# Patient Record
Sex: FEMALE | Race: WHITE | NOT HISPANIC OR LATINO | URBAN - METROPOLITAN AREA
[De-identification: names, ages, dates, MRNs, and addresses within clinical notes are randomized per-mention and may not be internally consistent; named-entity substitution may affect disease eponyms.]

---

## 2023-03-24 PROBLEM — H18.603 KERATOCONUS OF BOTH EYES: Status: ACTIVE | Noted: 2023-03-24

## 2023-03-24 PROBLEM — R06.02 SOB (SHORTNESS OF BREATH): Status: ACTIVE | Noted: 2023-03-24

## 2023-03-24 PROBLEM — D21.9 FIBROID: Status: ACTIVE | Noted: 2023-03-24

## 2023-03-24 PROBLEM — M06.9 RHEUMATOID ARTHRITIS (MULTI): Status: ACTIVE | Noted: 2023-03-24

## 2023-03-24 PROBLEM — H25.811 COMBINED FORMS OF AGE-RELATED CATARACT OF RIGHT EYE: Status: ACTIVE | Noted: 2023-03-24

## 2023-03-24 PROBLEM — R53.83 FATIGUE: Status: ACTIVE | Noted: 2023-03-24

## 2023-03-24 PROBLEM — M85.80 OSTEOPENIA: Status: ACTIVE | Noted: 2023-03-24

## 2023-03-24 PROBLEM — E78.5 HYPERLIPIDEMIA: Status: ACTIVE | Noted: 2023-03-24

## 2023-03-24 PROBLEM — R07.81 RIB PAIN ON LEFT SIDE: Status: ACTIVE | Noted: 2023-03-24

## 2023-03-24 PROBLEM — R50.9 FEBRILE ILLNESS: Status: ACTIVE | Noted: 2023-03-24

## 2023-03-24 PROBLEM — R19.4 CHANGE IN BOWEL HABITS: Status: ACTIVE | Noted: 2023-03-24

## 2023-03-24 PROBLEM — G47.419 NARCOLEPSY (HHS-HCC): Status: ACTIVE | Noted: 2023-03-24

## 2023-03-24 PROBLEM — N18.9 CKD (CHRONIC KIDNEY DISEASE): Status: ACTIVE | Noted: 2023-03-24

## 2023-03-24 PROBLEM — H91.90 HEARING DIFFICULTY: Status: ACTIVE | Noted: 2023-03-24

## 2023-03-24 PROBLEM — K59.00 CONSTIPATION: Status: ACTIVE | Noted: 2023-03-24

## 2023-03-24 PROBLEM — K44.9 HIATAL HERNIA: Status: ACTIVE | Noted: 2023-03-24

## 2023-03-24 PROBLEM — N39.0 RECURRENT UTI (URINARY TRACT INFECTION): Status: ACTIVE | Noted: 2023-03-24

## 2023-03-24 PROBLEM — R07.89 CHEST PAIN, ATYPICAL: Status: ACTIVE | Noted: 2023-03-24

## 2023-03-24 PROBLEM — H25.812 COMBINED FORMS OF AGE-RELATED CATARACT OF LEFT EYE: Status: ACTIVE | Noted: 2023-03-24

## 2023-03-24 PROBLEM — E67.3 HIGH VITAMIN D LEVEL: Status: ACTIVE | Noted: 2023-03-24

## 2023-03-24 PROBLEM — N83.299 OTHER OVARIAN CYST, UNSPECIFIED SIDE: Status: ACTIVE | Noted: 2023-03-24

## 2023-03-24 PROBLEM — N83.9 LESION OF LEFT OVARY: Status: ACTIVE | Noted: 2023-03-24

## 2023-03-24 PROBLEM — M15.9 GENERALIZED OSTEOARTHRITIS OF HAND: Status: ACTIVE | Noted: 2023-03-24

## 2023-03-24 PROBLEM — D25.9 UTERINE FIBROID: Status: ACTIVE | Noted: 2023-03-24

## 2023-03-24 PROBLEM — N13.30 HYDRONEPHROSIS OF LEFT KIDNEY: Status: ACTIVE | Noted: 2023-03-24

## 2023-03-24 RX ORDER — ESTRADIOL 0.1 MG/G
CREAM VAGINAL
COMMUNITY
End: 2024-03-05 | Stop reason: ALTCHOICE

## 2023-03-24 RX ORDER — ATORVASTATIN CALCIUM 40 MG/1
1 TABLET, FILM COATED ORAL DAILY
COMMUNITY
End: 2023-12-18

## 2023-03-24 RX ORDER — BENZONATATE 200 MG/1
200 CAPSULE ORAL 3 TIMES DAILY PRN
COMMUNITY
End: 2024-03-05 | Stop reason: ALTCHOICE

## 2023-03-24 RX ORDER — ARMODAFINIL 200 MG/1
0.5 TABLET ORAL
COMMUNITY
End: 2024-03-05 | Stop reason: ALTCHOICE

## 2023-03-24 RX ORDER — HYDROXYCHLOROQUINE SULFATE 200 MG/1
1 TABLET, FILM COATED ORAL DAILY
COMMUNITY
Start: 2021-08-30

## 2023-03-24 RX ORDER — DOCUSATE SODIUM 250 MG
1 CAPSULE ORAL DAILY
COMMUNITY

## 2023-03-29 ENCOUNTER — OFFICE VISIT (OUTPATIENT)
Dept: PRIMARY CARE | Facility: CLINIC | Age: 73
End: 2023-03-29
Payer: MEDICARE

## 2023-03-29 VITALS
DIASTOLIC BLOOD PRESSURE: 80 MMHG | RESPIRATION RATE: 16 BRPM | HEIGHT: 63 IN | HEART RATE: 58 BPM | SYSTOLIC BLOOD PRESSURE: 146 MMHG | OXYGEN SATURATION: 98 % | TEMPERATURE: 97.3 F | WEIGHT: 128.6 LBS | BODY MASS INDEX: 22.79 KG/M2

## 2023-03-29 DIAGNOSIS — G47.419 PRIMARY NARCOLEPSY WITHOUT CATAPLEXY (HHS-HCC): ICD-10-CM

## 2023-03-29 DIAGNOSIS — M15.9 GENERALIZED OSTEOARTHRITIS OF HAND: ICD-10-CM

## 2023-03-29 DIAGNOSIS — Z12.31 ENCOUNTER FOR SCREENING MAMMOGRAM FOR BREAST CANCER: ICD-10-CM

## 2023-03-29 DIAGNOSIS — M85.89 OSTEOPENIA OF MULTIPLE SITES: ICD-10-CM

## 2023-03-29 DIAGNOSIS — R06.02 SOB (SHORTNESS OF BREATH): ICD-10-CM

## 2023-03-29 DIAGNOSIS — R06.02 EXERTIONAL SHORTNESS OF BREATH: ICD-10-CM

## 2023-03-29 DIAGNOSIS — M06.9 RHEUMATOID ARTHRITIS INVOLVING MULTIPLE SITES, UNSPECIFIED WHETHER RHEUMATOID FACTOR PRESENT (MULTI): ICD-10-CM

## 2023-03-29 DIAGNOSIS — Z00.00 ROUTINE GENERAL MEDICAL EXAMINATION AT HEALTH CARE FACILITY: Primary | ICD-10-CM

## 2023-03-29 DIAGNOSIS — R03.0 ELEVATED BLOOD PRESSURE READING WITHOUT DIAGNOSIS OF HYPERTENSION: ICD-10-CM

## 2023-03-29 DIAGNOSIS — E78.2 MIXED HYPERLIPIDEMIA: ICD-10-CM

## 2023-03-29 PROCEDURE — 1160F RVW MEDS BY RX/DR IN RCRD: CPT | Performed by: INTERNAL MEDICINE

## 2023-03-29 PROCEDURE — 1159F MED LIST DOCD IN RCRD: CPT | Performed by: INTERNAL MEDICINE

## 2023-03-29 PROCEDURE — 1170F FXNL STATUS ASSESSED: CPT | Performed by: INTERNAL MEDICINE

## 2023-03-29 PROCEDURE — 1036F TOBACCO NON-USER: CPT | Performed by: INTERNAL MEDICINE

## 2023-03-29 PROCEDURE — 93000 ELECTROCARDIOGRAM COMPLETE: CPT | Performed by: INTERNAL MEDICINE

## 2023-03-29 PROCEDURE — G0439 PPPS, SUBSEQ VISIT: HCPCS | Performed by: INTERNAL MEDICINE

## 2023-03-29 ASSESSMENT — ENCOUNTER SYMPTOMS
PSYCHIATRIC NEGATIVE: 1
ABDOMINAL PAIN: 0
MYALGIAS: 0
UNEXPECTED WEIGHT CHANGE: 0
VOMITING: 0
HEMATOLOGIC/LYMPHATIC NEGATIVE: 1
DYSURIA: 0
TREMORS: 0
SLEEP DISTURBANCE: 0
COLOR CHANGE: 0
BACK PAIN: 0
CARDIOVASCULAR NEGATIVE: 1
ARTHRALGIAS: 0
LOSS OF SENSATION IN FEET: 0
OCCASIONAL FEELINGS OF UNSTEADINESS: 0
CONSTIPATION: 0
SEIZURES: 0
DIFFICULTY URINATING: 0
SINUS PAIN: 0
SORE THROAT: 0
WEAKNESS: 0
BLOOD IN STOOL: 0
NECK PAIN: 0
CONFUSION: 0
NUMBNESS: 0
DEPRESSION: 0
ALLERGIC/IMMUNOLOGIC NEGATIVE: 1
EYE PAIN: 0
COUGH: 0
DIZZINESS: 0
NECK STIFFNESS: 0
MUSCULOSKELETAL NEGATIVE: 1
NERVOUS/ANXIOUS: 0
CHEST TIGHTNESS: 0
STRIDOR: 0
POLYDIPSIA: 0
NAUSEA: 0
ACTIVITY CHANGE: 0
EYE DISCHARGE: 0
VOICE CHANGE: 0
LIGHT-HEADEDNESS: 0
HALLUCINATIONS: 0
BRUISES/BLEEDS EASILY: 0
HEADACHES: 0
ADENOPATHY: 0
SHORTNESS OF BREATH: 1
CONSTITUTIONAL NEGATIVE: 1
FREQUENCY: 0
FLANK PAIN: 0
APPETITE CHANGE: 0
POLYPHAGIA: 0
NEUROLOGICAL NEGATIVE: 1
DIARRHEA: 0
FACIAL ASYMMETRY: 0
AGITATION: 0
SINUS PRESSURE: 0
ENDOCRINE NEGATIVE: 1
TROUBLE SWALLOWING: 0
SPEECH DIFFICULTY: 0
JOINT SWELLING: 0
WHEEZING: 0
EYE REDNESS: 0
WOUND: 0
GASTROINTESTINAL NEGATIVE: 1
EYES NEGATIVE: 1
PALPITATIONS: 0

## 2023-03-29 ASSESSMENT — PATIENT HEALTH QUESTIONNAIRE - PHQ9
1. LITTLE INTEREST OR PLEASURE IN DOING THINGS: NOT AT ALL
2. FEELING DOWN, DEPRESSED OR HOPELESS: NOT AT ALL
SUM OF ALL RESPONSES TO PHQ9 QUESTIONS 1 AND 2: 0

## 2023-03-29 ASSESSMENT — ACTIVITIES OF DAILY LIVING (ADL)
DOING_HOUSEWORK: INDEPENDENT
MANAGING_FINANCES: INDEPENDENT
GROCERY_SHOPPING: INDEPENDENT
DRESSING: INDEPENDENT
TAKING_MEDICATION: INDEPENDENT
BATHING: INDEPENDENT

## 2023-03-29 NOTE — ASSESSMENT & PLAN NOTE
Please maintain enough calcium in your diet:  1948-8968 mg daily (consume foods rich in calcium rather than taking only calcium supplement to meet daily calcium requirements), take daily Vitamin D supplement with meal. Average Vit D dose is 2000 international units daily.  Weight bearing exercise routine is recommended.

## 2023-03-29 NOTE — PROGRESS NOTES
Subjective   Reason for Visit: Sonya Schaffer is an 72 y.o. female here for a Medicare Wellness visit.     Past Medical, Surgical, and Family History reviewed and updated in chart.    Reviewed all medications by prescribing practitioner or clinical pharmacist (such as prescriptions, OTCs, herbal therapies and supplements) and documented in the medical record.    HPI    Patient comes for Medicare wellness visit.     Patient has been feeling pretty good and has been complaint with prescribed medications.    URI sx resolved, ribcage pain resolved, no fx seen on X-ray in Feb 2023.    We reviewed and discussed details of recent blood work: CBC, CMP, TSH, Lipid panel, Hb A1c, Vit D done in Oct 2022.  Results within acceptable range.      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    Concerned abut SOB with exertion, ( when walking up the hill), she has been exercising regularly, her HR usually below 90 bpm.  Today's EKG nl, will obtain treadmill stress test.   Elevated blood pressure noted on arrival. Decreased after resting in the office. I personally rechecked patient's blood pressure.           Patient Care Team:  Kelsie Bolden MD PhD as PCP - General  Kelsie Bolden MD PhD as PCP - [facility] [employer] [facility] PCP     Review of Systems   Constitutional: Negative.  Negative for activity change, appetite change and unexpected weight change.   HENT: Negative.  Negative for congestion, ear discharge, ear pain, hearing loss, mouth sores, nosebleeds, sinus pressure, sinus pain, sore throat, trouble swallowing and voice change.    Eyes: Negative.  Negative for pain, discharge, redness and visual disturbance.   Respiratory:  Positive for shortness of breath. Negative for cough, chest tightness, wheezing and stridor.    Cardiovascular: Negative.  Negative for chest pain, palpitations and leg swelling.   Gastrointestinal: Negative.  Negative for abdominal pain, blood in stool, constipation,  "diarrhea, nausea and vomiting.   Endocrine: Negative.  Negative for polydipsia, polyphagia and polyuria.   Genitourinary: Negative.  Negative for difficulty urinating, dysuria, flank pain, frequency and urgency.   Musculoskeletal: Negative.  Negative for arthralgias, back pain, gait problem, joint swelling, myalgias, neck pain and neck stiffness.   Skin: Negative.  Negative for color change, rash and wound.   Allergic/Immunologic: Negative.  Negative for environmental allergies, food allergies and immunocompromised state.   Neurological: Negative.  Negative for dizziness, tremors, seizures, syncope, facial asymmetry, speech difficulty, weakness, light-headedness, numbness and headaches.   Hematological: Negative.  Negative for adenopathy. Does not bruise/bleed easily.   Psychiatric/Behavioral: Negative.  Negative for agitation, behavioral problems, confusion, hallucinations, sleep disturbance and suicidal ideas. The patient is not nervous/anxious.    All other systems reviewed and are negative.      Objective   Vitals:  /80   Pulse 58   Temp 36.3 °C (97.3 °F)   Resp 16   Ht 1.6 m (5' 3\")   Wt 58.3 kg (128 lb 9.6 oz)   SpO2 98%   BMI 22.78 kg/m²       Physical Exam  Vitals and nursing note reviewed.   Constitutional:       General: She is not in acute distress.     Appearance: Normal appearance.   HENT:      Head: Normocephalic and atraumatic.      Right Ear: Tympanic membrane, ear canal and external ear normal.      Left Ear: Tympanic membrane, ear canal and external ear normal.      Nose: Nose normal. No congestion or rhinorrhea.   Eyes:      General:         Right eye: No discharge.         Left eye: No discharge.      Extraocular Movements: Extraocular movements intact.      Conjunctiva/sclera: Conjunctivae normal.      Pupils: Pupils are equal, round, and reactive to light.   Cardiovascular:      Rate and Rhythm: Normal rate and regular rhythm.      Pulses: Normal pulses.      Heart sounds: Normal " heart sounds. No murmur heard.     No friction rub. No gallop.   Pulmonary:      Effort: Pulmonary effort is normal. No respiratory distress.      Breath sounds: Normal breath sounds. No stridor. No wheezing, rhonchi or rales.   Chest:      Chest wall: No tenderness.   Abdominal:      General: Bowel sounds are normal.      Palpations: Abdomen is soft. There is no mass.      Tenderness: There is no abdominal tenderness. There is no guarding or rebound.   Musculoskeletal:         General: No swelling or deformity. Normal range of motion.      Cervical back: Normal range of motion and neck supple. No rigidity or tenderness.      Right lower leg: No edema.      Left lower leg: No edema.   Lymphadenopathy:      Cervical: No cervical adenopathy.   Skin:     General: Skin is warm and dry.      Coloration: Skin is not jaundiced.      Findings: No bruising or erythema.   Neurological:      General: No focal deficit present.      Mental Status: She is alert and oriented to person, place, and time. Mental status is at baseline.      Cranial Nerves: No cranial nerve deficit.      Motor: No weakness.      Coordination: Coordination normal.      Gait: Gait normal.   Psychiatric:         Mood and Affect: Mood normal.         Behavior: Behavior normal.         Thought Content: Thought content normal.         Judgment: Judgment normal.         Assessment/Plan   Problem List Items Addressed This Visit          Nervous    Narcolepsy    Current Assessment & Plan     Clinically stable. F/up with sleep specialist.            Respiratory    SOB (shortness of breath)    Exertional shortness of breath    Relevant Orders    ECG 12 lead (Clinic Performed) (Completed)    Stress Test       Circulatory    Elevated blood pressure reading without diagnosis of hypertension    Current Assessment & Plan     Monitor BP at home (with ORON 5), bring records for next appt.             Musculoskeletal    Generalized osteoarthritis of hand    Current  Assessment & Plan     Ise Voltaren gel as needed, consult rheumatologist isf sx not improving.         Osteopenia    Current Assessment & Plan     Please maintain enough calcium in your diet:  6026-7404 mg daily (consume foods rich in calcium rather than taking only calcium supplement to meet daily calcium requirements), take daily Vitamin D supplement with meal. Average Vit D dose is 2000 international units daily.  Weight bearing exercise routine is recommended.             Other    Hyperlipidemia    Current Assessment & Plan     Low cholesterol diet is advised. Continue statin.         Rheumatoid arthritis (CMS/[facility])    Overview     Chronic condition documentation: stable based on symptoms and exam. Continue established treatment plan and follow-up at least yearly. Additional details: under rheumatology and PCP care         Current Assessment & Plan     Clinically stable. F/up with rheumatologist.         Encounter for screening mammogram for breast cancer    Relevant Orders    BI mammo bilateral screening tomosynthesis    Routine general medical examination at health care facility - Primary    Current Assessment & Plan     Exam nl except elevated BP.          It was a pleasure to see you today.  I would like to remind you about importance of a healthy lifestyle in order to improve your well-being and live longer.  Try to engage in physical activities for at least 150 minutes per week.  Eat about 10 servings of fruits and vegetables daily. My advice is 2 servings of fruits and 8 servings of vegetables.  For vegetables choose at least half of them green and at least half of them fresh.  Please avoid sugar, salt, fried food and saturated fat.    Please bring copy of your Healthcare living Will and POA for next visit as discussed.    F/up in 3 months or sooner if needed.

## 2023-07-31 DIAGNOSIS — N83.9 LESION OF LEFT OVARY: Primary | ICD-10-CM

## 2023-07-31 DIAGNOSIS — D39.12 NEOPLASM OF UNCERTAIN BEHAVIOR OF LEFT OVARY: ICD-10-CM

## 2023-08-31 LAB — CANCER AG 125 (U/ML) IN SERUM: 6.8 U/ML (ref 0–30.2)

## 2023-10-25 ENCOUNTER — APPOINTMENT (OUTPATIENT)
Dept: UROLOGY | Facility: CLINIC | Age: 73
End: 2023-10-25
Payer: MEDICARE

## 2023-12-16 DIAGNOSIS — E78.2 MIXED HYPERLIPIDEMIA: Primary | ICD-10-CM

## 2023-12-18 RX ORDER — ATORVASTATIN CALCIUM 40 MG/1
40 TABLET, FILM COATED ORAL DAILY
Qty: 90 TABLET | Refills: 0 | Status: SHIPPED | OUTPATIENT
Start: 2023-12-18 | End: 2024-03-29

## 2024-03-05 ENCOUNTER — OFFICE VISIT (OUTPATIENT)
Dept: PRIMARY CARE | Facility: CLINIC | Age: 74
End: 2024-03-05
Payer: COMMERCIAL

## 2024-03-05 VITALS
RESPIRATION RATE: 14 BRPM | HEIGHT: 63 IN | BODY MASS INDEX: 22.82 KG/M2 | OXYGEN SATURATION: 97 % | SYSTOLIC BLOOD PRESSURE: 136 MMHG | TEMPERATURE: 97.9 F | HEART RATE: 60 BPM | WEIGHT: 128.8 LBS | DIASTOLIC BLOOD PRESSURE: 70 MMHG

## 2024-03-05 DIAGNOSIS — M85.89 OSTEOPENIA OF MULTIPLE SITES: ICD-10-CM

## 2024-03-05 DIAGNOSIS — N83.299 OTHER OVARIAN CYST, UNSPECIFIED SIDE: ICD-10-CM

## 2024-03-05 DIAGNOSIS — Z00.00 ROUTINE GENERAL MEDICAL EXAMINATION AT HEALTH CARE FACILITY: Primary | ICD-10-CM

## 2024-03-05 DIAGNOSIS — R03.0 ELEVATED BLOOD PRESSURE READING WITHOUT DIAGNOSIS OF HYPERTENSION: ICD-10-CM

## 2024-03-05 DIAGNOSIS — R53.83 OTHER FATIGUE: ICD-10-CM

## 2024-03-05 DIAGNOSIS — Z12.31 ENCOUNTER FOR SCREENING MAMMOGRAM FOR BREAST CANCER: ICD-10-CM

## 2024-03-05 DIAGNOSIS — Z78.0 ASYMPTOMATIC MENOPAUSAL STATE: ICD-10-CM

## 2024-03-05 DIAGNOSIS — E78.2 MIXED HYPERLIPIDEMIA: ICD-10-CM

## 2024-03-05 DIAGNOSIS — M06.9 RHEUMATOID ARTHRITIS INVOLVING MULTIPLE SITES, UNSPECIFIED WHETHER RHEUMATOID FACTOR PRESENT (MULTI): ICD-10-CM

## 2024-03-05 PROCEDURE — 1123F ACP DISCUSS/DSCN MKR DOCD: CPT | Performed by: INTERNAL MEDICINE

## 2024-03-05 PROCEDURE — 1170F FXNL STATUS ASSESSED: CPT | Performed by: INTERNAL MEDICINE

## 2024-03-05 PROCEDURE — 1158F ADVNC CARE PLAN TLK DOCD: CPT | Performed by: INTERNAL MEDICINE

## 2024-03-05 PROCEDURE — G0439 PPPS, SUBSEQ VISIT: HCPCS | Performed by: INTERNAL MEDICINE

## 2024-03-05 PROCEDURE — 1160F RVW MEDS BY RX/DR IN RCRD: CPT | Performed by: INTERNAL MEDICINE

## 2024-03-05 PROCEDURE — 1159F MED LIST DOCD IN RCRD: CPT | Performed by: INTERNAL MEDICINE

## 2024-03-05 PROCEDURE — 1126F AMNT PAIN NOTED NONE PRSNT: CPT | Performed by: INTERNAL MEDICINE

## 2024-03-05 PROCEDURE — 1036F TOBACCO NON-USER: CPT | Performed by: INTERNAL MEDICINE

## 2024-03-05 PROCEDURE — 99397 PER PM REEVAL EST PAT 65+ YR: CPT | Performed by: INTERNAL MEDICINE

## 2024-03-05 PROCEDURE — 99214 OFFICE O/P EST MOD 30 MIN: CPT | Performed by: INTERNAL MEDICINE

## 2024-03-05 ASSESSMENT — ENCOUNTER SYMPTOMS
NERVOUS/ANXIOUS: 0
WEAKNESS: 0
PSYCHIATRIC NEGATIVE: 1
SLEEP DISTURBANCE: 0
SEIZURES: 0
DIZZINESS: 0
TREMORS: 0
BRUISES/BLEEDS EASILY: 0
LOSS OF SENSATION IN FEET: 0
NAUSEA: 0
DEPRESSION: 0
SHORTNESS OF BREATH: 0
HALLUCINATIONS: 0
LIGHT-HEADEDNESS: 0
POLYPHAGIA: 0
RESPIRATORY NEGATIVE: 1
NECK STIFFNESS: 0
COUGH: 0
EYE PAIN: 0
NEUROLOGICAL NEGATIVE: 1
WOUND: 0
ENDOCRINE NEGATIVE: 1
GASTROINTESTINAL NEGATIVE: 1
PALPITATIONS: 0
ACTIVITY CHANGE: 0
NUMBNESS: 0
HEMATOLOGIC/LYMPHATIC NEGATIVE: 1
OCCASIONAL FEELINGS OF UNSTEADINESS: 0
EYE REDNESS: 0
CARDIOVASCULAR NEGATIVE: 1
WHEEZING: 0
VOICE CHANGE: 0
ADENOPATHY: 0
CONFUSION: 0
DYSURIA: 0
TROUBLE SWALLOWING: 0
AGITATION: 0
ABDOMINAL PAIN: 0
SINUS PAIN: 0
BLOOD IN STOOL: 0
FLANK PAIN: 0
NECK PAIN: 0
CONSTIPATION: 0
BACK PAIN: 0
CHEST TIGHTNESS: 0
CONSTITUTIONAL NEGATIVE: 1
MYALGIAS: 0
EYE DISCHARGE: 0
SORE THROAT: 0
MUSCULOSKELETAL NEGATIVE: 1
ARTHRALGIAS: 0
JOINT SWELLING: 0
APPETITE CHANGE: 0
DIARRHEA: 0
STRIDOR: 0
HEADACHES: 0
DIFFICULTY URINATING: 0
ALLERGIC/IMMUNOLOGIC NEGATIVE: 1
COLOR CHANGE: 0
VOMITING: 0
UNEXPECTED WEIGHT CHANGE: 0
POLYDIPSIA: 0
SINUS PRESSURE: 0
FACIAL ASYMMETRY: 0
SPEECH DIFFICULTY: 0
FREQUENCY: 0
EYES NEGATIVE: 1

## 2024-03-05 ASSESSMENT — ACTIVITIES OF DAILY LIVING (ADL)
DOING_HOUSEWORK: INDEPENDENT
MANAGING_FINANCES: INDEPENDENT
GROCERY_SHOPPING: INDEPENDENT
BATHING: INDEPENDENT
TAKING_MEDICATION: INDEPENDENT
DRESSING: INDEPENDENT

## 2024-03-05 NOTE — ASSESSMENT & PLAN NOTE
Monitor BP at home (with ORON 5), bring records for next appt.   Low salt diet is advised. Let me know if blood pressure consistently above 140/90.

## 2024-03-05 NOTE — ASSESSMENT & PLAN NOTE
Please maintain enough calcium in your diet:  2464-4310 mg daily (consume foods rich in calcium rather than taking only calcium supplement to meet daily calcium requirements), take daily Vitamin D supplement with meal. Average Vit D dose is 2000 international units daily.  Weight bearing exercise routine is recommended.

## 2024-03-05 NOTE — PROGRESS NOTES
Subjective   Reason for Visit: Sonya Schaffer is an 73 y.o. female here for a Medicare Wellness visit.     Past Medical, Surgical, and Family History reviewed and updated in chart.    Reviewed all medications by prescribing practitioner or clinical pharmacist (such as prescriptions, OTCs, herbal therapies and supplements) and documented in the medical record.    [facility]    Patient Care Team:  Kelsie Bolden MD PhD as PCP - General  Kelsie Bolden MD PhD as PCP - [facility] P[facility]  Kelsie Bolden MD PhD as PCP - [facility] PCP       Patient comes for Medicare Wellness, Physical Exam and Follow up visit.     Patient has been feeling pretty good and has been complaint with prescribed medications.    We reviewed blood work including CBC, CMP, TSH, Lipid Panel, HbA1c, Vit D level done in .  Results within acceptable range.    Mammogram: 2023  DEXA: : osteopenia  Colon ca screenin, next   Pneumonia Vacc:   Advance Directives: Advance Directives: Healthcare Living Will and POA not on file. Patient advised to complete forms and bring/mail to the office.   POA discussed, confirmed and documented in patient's  EPIC record.     Patient continues to work part time at , enjoying her time with children.     Elevated blood pressure noted on arrival. Decreased after resting in the office. I personally rechecked patient's blood pressure.     She went to Urgent Care few months ago with UTI sx, treated with antibiotic and improved.     No longer using  Estrogen cream.    Review of Systems   Constitutional: Negative.  Negative for activity change, appetite change and unexpected weight change.   HENT: Negative.  Negative for congestion, ear discharge, ear pain, hearing loss, mouth sores, nosebleeds, sinus pressure, sinus pain, sore throat, trouble swallowing and voice change.    Eyes: Negative.  Negative for pain, discharge, redness and visual disturbance.  "  Respiratory: Negative.  Negative for cough, chest tightness, shortness of breath, wheezing and stridor.    Cardiovascular: Negative.  Negative for chest pain, palpitations and leg swelling.   Gastrointestinal: Negative.  Negative for abdominal pain, blood in stool, constipation, diarrhea, nausea and vomiting.   Endocrine: Negative.  Negative for polydipsia, polyphagia and polyuria.   Genitourinary: Negative.  Negative for difficulty urinating, dysuria, flank pain, frequency and urgency.   Musculoskeletal: Negative.  Negative for arthralgias, back pain, gait problem, joint swelling, myalgias, neck pain and neck stiffness.   Skin: Negative.  Negative for color change, rash and wound.   Allergic/Immunologic: Negative.  Negative for environmental allergies, food allergies and immunocompromised state.   Neurological: Negative.  Negative for dizziness, tremors, seizures, syncope, facial asymmetry, speech difficulty, weakness, light-headedness, numbness and headaches.   Hematological: Negative.  Negative for adenopathy. Does not bruise/bleed easily.   Psychiatric/Behavioral: Negative.  Negative for agitation, behavioral problems, confusion, hallucinations, sleep disturbance and suicidal ideas. The patient is not nervous/anxious.    All other systems reviewed and are negative.      Objective   Vitals:  /70   Pulse 60   Temp 36.6 °C (97.9 °F)   Resp 14   Ht 1.6 m (5' 3\")   Wt 58.4 kg (128 lb 12.8 oz)   SpO2 97%   BMI 22.82 kg/m²       Physical Exam  Vitals and nursing note reviewed.   Constitutional:       General: She is not in acute distress.     Appearance: Normal appearance.   HENT:      Head: Normocephalic and atraumatic.      Right Ear: Tympanic membrane, ear canal and external ear normal.      Left Ear: Tympanic membrane, ear canal and external ear normal.      Nose: Nose normal. No congestion or rhinorrhea.      Mouth/Throat:      Mouth: Mucous membranes are moist.      Pharynx: Oropharynx is clear. "   Eyes:      General:         Right eye: No discharge.         Left eye: No discharge.      Extraocular Movements: Extraocular movements intact.      Conjunctiva/sclera: Conjunctivae normal.      Pupils: Pupils are equal, round, and reactive to light.   Cardiovascular:      Rate and Rhythm: Normal rate and regular rhythm.      Pulses: Normal pulses.      Heart sounds: Normal heart sounds. No murmur heard.     No friction rub. No gallop.   Pulmonary:      Effort: Pulmonary effort is normal. No respiratory distress.      Breath sounds: Normal breath sounds. No stridor. No wheezing, rhonchi or rales.   Chest:      Chest wall: No tenderness.   Abdominal:      General: Bowel sounds are normal.      Palpations: Abdomen is soft. There is no mass.      Tenderness: There is no abdominal tenderness. There is no guarding or rebound.   Musculoskeletal:         General: No swelling or deformity. Normal range of motion.      Cervical back: Normal range of motion and neck supple. No rigidity or tenderness.      Right lower leg: No edema.      Left lower leg: No edema.   Lymphadenopathy:      Cervical: No cervical adenopathy.   Skin:     General: Skin is warm and dry.      Coloration: Skin is not jaundiced.      Findings: No bruising or erythema.   Neurological:      General: No focal deficit present.      Mental Status: She is alert and oriented to person, place, and time. Mental status is at baseline.      Cranial Nerves: No cranial nerve deficit.      Motor: No weakness.      Coordination: Coordination normal.      Gait: Gait normal.   Psychiatric:         Mood and Affect: Mood normal.         Behavior: Behavior normal.         Thought Content: Thought content normal.         Judgment: Judgment normal.         Assessment/Plan   Problem List Items Addressed This Visit          Cardiac and Vasculature    Hyperlipidemia    Current Assessment & Plan     Low cholesterol diet is advised. Continue statin.         Relevant Orders     Comprehensive Metabolic Panel    Lipid Panel    Elevated blood pressure reading without diagnosis of hypertension    Current Assessment & Plan     Monitor BP at home (with ORON 5), bring records for next appt.   Low salt diet is advised. Let me know if blood pressure consistently above 140/90.            Genitourinary and Reproductive    Other ovarian cyst, unspecified side    Current Assessment & Plan     Follow up with Gynecologist is advised.         Encounter for screening mammogram for breast cancer    Relevant Orders    BI mammo bilateral screening tomosynthesis       Health Encounters    Routine general medical examination at health care facility - Primary       Multi-system (Lupus, Sarcoid)    Rheumatoid arthritis (CMS/[facility])    Overview     Chronic condition documentation: stable based on symptoms and exam. Continue established treatment plan and follow-up at least yearly. Additional details: under rheumatology and PCP care         Current Assessment & Plan     Clinically stable. F/up with rheumatologist.             Musculoskeletal and Injuries    Osteopenia    Current Assessment & Plan     Please maintain enough calcium in your diet:  9027-7909 mg daily (consume foods rich in calcium rather than taking only calcium supplement to meet daily calcium requirements), take daily Vitamin D supplement with meal. Average Vit D dose is 2000 international units daily.  Weight bearing exercise routine is recommended.          Relevant Orders    Vitamin D 25-Hydroxy,Total (for eval of Vitamin D levels)       Symptoms and Signs    Fatigue    Relevant Orders    CBC    TSH with reflex to Free T4 if abnormal     Other Visit Diagnoses       Asymptomatic menopausal state        Relevant Orders    XR DEXA bone density        It was a pleasure to see you today.  I would like to remind you about importance of a healthy lifestyle in order to improve your well-being and live longer.  Try to engage in physical activities for at  least 150 minutes per week.  Eat about 10 servings of fruits and vegetables daily. My advice is 2 servings of fruits and 8 servings of vegetables.  For vegetables choose at least half of them green and at least half of them fresh.  Please avoid sugar, salt, fried food and saturated fat.    I spent a total of 30 minutes on the date of service for follow up visit, which included preparing to see the patient, face-to-face patient care, completing clinical documentation, obtaining and/or reviewing separately obtained history, performing a medically appropriate examination, counseling and educating the patient/family/caregiver, ordering medications, tests, or procedures, communicating with other health care providers (not separately reported), independently interpreting results (not separately reported), communicating results to the patient/family/caregiver, and care coordination (not separately reported).    Please bring copy of your Healthcare Living Will and POA for next visit as discussed.    F/up in 1 year or sooner if needed.     Patient was identified as a fall risk. Risk prevention instructions provided.

## 2024-03-29 DIAGNOSIS — E78.2 MIXED HYPERLIPIDEMIA: ICD-10-CM

## 2024-03-29 RX ORDER — ATORVASTATIN CALCIUM 40 MG/1
40 TABLET, FILM COATED ORAL DAILY
Qty: 90 TABLET | Refills: 0 | Status: SHIPPED | OUTPATIENT
Start: 2024-03-29

## 2024-04-02 ENCOUNTER — TELEPHONE (OUTPATIENT)
Dept: OBSTETRICS AND GYNECOLOGY | Facility: CLINIC | Age: 74
End: 2024-04-02
Payer: COMMERCIAL

## 2024-04-02 DIAGNOSIS — N39.0 RECURRENT UTI (URINARY TRACT INFECTION): Primary | ICD-10-CM

## 2024-04-02 NOTE — TELEPHONE ENCOUNTER
Sonya Schaffer called in requesting a Rx be sent to [facility].    Last Office Visit: 8/31/2023  Med: Sulfamethoxazole-Trimethoprim 800-160 MG Oral Tablet   Pharmacy: [facility]

## 2024-04-03 RX ORDER — SULFAMETHOXAZOLE AND TRIMETHOPRIM 800; 160 MG/1; MG/1
1 TABLET ORAL 2 TIMES DAILY
Qty: 14 TABLET | Refills: 0 | Status: SHIPPED | OUTPATIENT
Start: 2024-04-03 | End: 2024-04-10

## 2024-05-02 ENCOUNTER — APPOINTMENT (OUTPATIENT)
Dept: PRIMARY CARE | Facility: CLINIC | Age: 74
End: 2024-05-02
Payer: COMMERCIAL

## 2024-06-28 DIAGNOSIS — E78.2 MIXED HYPERLIPIDEMIA: ICD-10-CM

## 2024-06-28 RX ORDER — ATORVASTATIN CALCIUM 40 MG/1
40 TABLET, FILM COATED ORAL DAILY
Qty: 30 TABLET | Refills: 0 | Status: SHIPPED | OUTPATIENT
Start: 2024-06-28

## 2024-08-15 ENCOUNTER — TELEPHONE (OUTPATIENT)
Dept: OBSTETRICS AND GYNECOLOGY | Facility: CLINIC | Age: 74
End: 2024-08-15
Payer: COMMERCIAL

## 2024-08-15 DIAGNOSIS — N39.0 ACUTE UTI: Primary | ICD-10-CM

## 2024-08-15 RX ORDER — SULFAMETHOXAZOLE AND TRIMETHOPRIM 800; 160 MG/1; MG/1
1 TABLET ORAL 2 TIMES DAILY
Qty: 14 TABLET | Refills: 0 | Status: SHIPPED | OUTPATIENT
Start: 2024-08-15 | End: 2024-08-22

## 2024-08-15 NOTE — TELEPHONE ENCOUNTER
Sonya Schaffer called in requesting Rx be sent to the pharmacy.    Last Office Visit: 8/31/2023  Next Office Visit: 9/12/2024  Med:sulfamethoxazole-trimethoprim (Bactrim DS) 800-160 mg tablet   Pharmacy: [facility]     [facility] Pregnancy And Lactation Text: This medication is Pregnancy Category B and is considered safe. There is some data to suggest avoiding during the third trimester. It is unknown if this medication is excreted in breast milk.

## 2024-09-11 DIAGNOSIS — E78.2 MIXED HYPERLIPIDEMIA: ICD-10-CM

## 2024-09-11 RX ORDER — ATORVASTATIN CALCIUM 40 MG/1
40 TABLET, FILM COATED ORAL DAILY
Qty: 30 TABLET | Refills: 3 | Status: SHIPPED | OUTPATIENT
Start: 2024-09-11

## 2024-09-11 NOTE — TELEPHONE ENCOUNTER
Hien from [facility] pharmacy team advised that patient's Atorvastatin 40mg is overdue for patient. Asking to check with patient to make sure she is compliant with medication.     [phone] ext [phone]

## 2024-09-12 ENCOUNTER — APPOINTMENT (OUTPATIENT)
Dept: OBSTETRICS AND GYNECOLOGY | Facility: CLINIC | Age: 74
End: 2024-09-12
Payer: COMMERCIAL

## 2024-09-19 ENCOUNTER — OFFICE VISIT (OUTPATIENT)
Dept: URGENT CARE | Age: 74
End: 2024-09-19
Payer: COMMERCIAL

## 2024-09-19 VITALS
RESPIRATION RATE: 14 BRPM | HEART RATE: 54 BPM | OXYGEN SATURATION: 97 % | DIASTOLIC BLOOD PRESSURE: 85 MMHG | SYSTOLIC BLOOD PRESSURE: 164 MMHG | TEMPERATURE: 98.3 F

## 2024-09-19 DIAGNOSIS — J01.01 ACUTE RECURRENT MAXILLARY SINUSITIS: Primary | ICD-10-CM

## 2024-09-19 DIAGNOSIS — R35.0 URINARY FREQUENCY: ICD-10-CM

## 2024-09-19 DIAGNOSIS — R05.9 COUGH, UNSPECIFIED TYPE: ICD-10-CM

## 2024-09-19 LAB
POC APPEARANCE, URINE: CLEAR
POC BILIRUBIN, URINE: NEGATIVE
POC BLOOD, URINE: NEGATIVE
POC COLOR, URINE: YELLOW
POC GLUCOSE, URINE: NEGATIVE MG/DL
POC KETONES, URINE: NEGATIVE MG/DL
POC LEUKOCYTES, URINE: NEGATIVE
POC NITRITE,URINE: NEGATIVE
POC PH, URINE: 6 PH
POC PROTEIN, URINE: NEGATIVE MG/DL
POC SARS-COV-2 AG BINAX: NORMAL
POC SPECIFIC GRAVITY, URINE: 1.01
POC UROBILINOGEN, URINE: 0.2 EU/DL

## 2024-09-19 RX ORDER — BENZONATATE 100 MG/1
100 CAPSULE ORAL 3 TIMES DAILY PRN
Qty: 30 CAPSULE | Refills: 0 | Status: SHIPPED | OUTPATIENT
Start: 2024-09-19 | End: 2024-09-29

## 2024-09-19 RX ORDER — AMOXICILLIN 500 MG/1
500 CAPSULE ORAL 2 TIMES DAILY
Qty: 20 CAPSULE | Refills: 0 | Status: SHIPPED | OUTPATIENT
Start: 2024-09-19 | End: 2024-09-29

## 2024-09-19 ASSESSMENT — PATIENT HEALTH QUESTIONNAIRE - PHQ9
2. FEELING DOWN, DEPRESSED OR HOPELESS: NOT AT ALL
SUM OF ALL RESPONSES TO PHQ9 QUESTIONS 1 AND 2: 0
1. LITTLE INTEREST OR PLEASURE IN DOING THINGS: NOT AT ALL

## 2024-10-29 ENCOUNTER — HOSPITAL ENCOUNTER (OUTPATIENT)
Dept: RADIOLOGY | Facility: CLINIC | Age: 74
Discharge: HOME | End: 2024-10-29
Payer: COMMERCIAL

## 2024-10-29 DIAGNOSIS — Z78.0 ASYMPTOMATIC MENOPAUSAL STATE: ICD-10-CM

## 2024-10-29 PROCEDURE — 77080 DXA BONE DENSITY AXIAL: CPT | Performed by: RADIOLOGY

## 2024-10-29 PROCEDURE — 77080 DXA BONE DENSITY AXIAL: CPT

## 2024-11-12 ENCOUNTER — APPOINTMENT (OUTPATIENT)
Dept: OBSTETRICS AND GYNECOLOGY | Facility: CLINIC | Age: 74
End: 2024-11-12
Payer: COMMERCIAL

## 2024-11-12 VITALS
DIASTOLIC BLOOD PRESSURE: 60 MMHG | WEIGHT: 127 LBS | BODY MASS INDEX: 22.5 KG/M2 | HEIGHT: 63 IN | SYSTOLIC BLOOD PRESSURE: 124 MMHG

## 2024-11-12 DIAGNOSIS — Z01.419 WELL WOMAN EXAM WITH ROUTINE GYNECOLOGICAL EXAM: Primary | ICD-10-CM

## 2024-11-12 PROCEDURE — 1159F MED LIST DOCD IN RCRD: CPT | Performed by: OBSTETRICS & GYNECOLOGY

## 2024-11-12 PROCEDURE — G0101 CA SCREEN;PELVIC/BREAST EXAM: HCPCS | Performed by: OBSTETRICS & GYNECOLOGY

## 2024-11-12 PROCEDURE — 3008F BODY MASS INDEX DOCD: CPT | Performed by: OBSTETRICS & GYNECOLOGY

## 2024-11-12 PROCEDURE — 1160F RVW MEDS BY RX/DR IN RCRD: CPT | Performed by: OBSTETRICS & GYNECOLOGY

## 2024-11-12 PROCEDURE — 1036F TOBACCO NON-USER: CPT | Performed by: OBSTETRICS & GYNECOLOGY

## 2024-11-12 PROCEDURE — 1123F ACP DISCUSS/DSCN MKR DOCD: CPT | Performed by: OBSTETRICS & GYNECOLOGY

## 2024-11-12 NOTE — PROGRESS NOTES
"Sonya Schafefr is a 73 y.o. female who is here for a annual exam.     Patient denies any vaginal bleeding    Menopause symptoms: denies    Sexually active: Postmenopausal  Active no concerns    Regular self breast exam: yes  no concerns on her exams    Menstrual History:  OB History          2    Para   1    Term   1            AB   1    Living   1         SAB        IAB        Ectopic        Multiple        Live Births                    No LMP recorded. Patient is postmenopausal.         Review of Systems  All Normal Review of Systems  Constitutional: no fever, no chills, no recent weight gain, no recent weight loss and no fatigue.    Gastrointestinal: no abdominal pain, no constipation, no nausea, no diarrhea and no vomiting.    Genitourinary: no dysuria, no urinary incontinence, no vaginal dryness, no vaginal itching, no dyspareunia, no pelvic pain, no dysmenorrhea, no sexual problems, no change in urinary frequency, no vaginal discharge, no unexplained vaginal bleeding and no lesion/sore.    Breasts: No masses.  No nipple discharge.  No redness    Objective   /60   Ht 1.6 m (5' 3\")   Wt 57.6 kg (127 lb)   BMI 22.50 kg/m²     OBGyn Exam   Physical Exam  Constitutional: Alert and in no acute distress. Well developed, well nourished.   Head and Face: Head and face: Normal.    Eyes: Normal external exam - nonicteric sclera, extraocular movements intact (EOMI) and no ptosis.   Ears, Nose, Mouth, and Throat: External inspection of ears and nose: Normal.    Neck: No neck asymmetry. Supple. Thyroid not enlarged and there were no palpable thyroid nodules.   Chest: Breasts: Normal appearance, no nipple discharge and no skin changes. Palpation of breasts and axillae: No palpable mass and no axillary lymphadenopathy.   Abdomen: Soft nontender; no abdominal mass palpated. No organomegaly. No hernias.     Genitourinary:   External genitalia: Normal. No inguinal lymphadenopathy. Bartholin's Urethral and " Skenes Glands: Normal. Urethra: Normal.    Bladder: Normal on palpation.   Vagina: Normal.   Cervix: Normal.    Uterus: Normal.    Right Adnexa/parametria: Normal.  Left Adnexa/parametria: Normal.    Inspection of Perianal Area: Normal.     Musculoskeletal: No joint swelling seen, normal movements of all extremities.   Skin: Normal skin color and pigmentation, normal skin turgor, and no rash.   Neurologic: Non-focal. Grossly intact.   Psychiatric: Alert and oriented x 3. Affect normal to patient baseline. Mood: Appropriate.      Assessment/Plan   1) Annual exam:  Normal exam today  Mammogram order in place    Thank you again for your visit to our office today  Please follow-up as needed or in 1 year with your annual exam

## 2025-01-02 ENCOUNTER — TELEPHONE (OUTPATIENT)
Dept: OBSTETRICS AND GYNECOLOGY | Facility: CLINIC | Age: 75
End: 2025-01-02
Payer: COMMERCIAL

## 2025-01-02 DIAGNOSIS — N39.0 ACUTE UTI: Primary | ICD-10-CM

## 2025-01-02 RX ORDER — NITROFURANTOIN (MACROCRYSTALS) 100 MG/1
100 CAPSULE ORAL 2 TIMES DAILY
Qty: 14 CAPSULE | Refills: 0 | Status: SHIPPED | OUTPATIENT
Start: 2025-01-02 | End: 2025-01-09

## 2025-01-02 NOTE — TELEPHONE ENCOUNTER
Patient called in requesting a RX to prevent urinary tract infections.Patient stated that in the pass she has developed recurrent infections after intercourse.    Last office visit:11/12/24  Next office visit:11/13/25  Med:  Pharmacy:  [id number] [address] [place] [address] Juma Stout CNA

## 2025-01-02 NOTE — TELEPHONE ENCOUNTER
Last RX given 08/22/2024    Dose: 1 tablet Route: oral Frequency: 2 times daily   Dispense Quantity: 14 tablet Refills: 0          Sig: Take 1 tablet by mouth 2 times a day for 7 days.         Start Date: 08/15/24 End Date: 08/22/24 after 14 doses   Written Date: 08/15/24 Rx Expiration Date: 08/15/25        Associated Diagnoses: Acute UTI [N39.0]

## 2025-03-05 ENCOUNTER — APPOINTMENT (OUTPATIENT)
Dept: PRIMARY CARE | Facility: CLINIC | Age: 75
End: 2025-03-05
Payer: COMMERCIAL

## 2025-11-13 ENCOUNTER — APPOINTMENT (OUTPATIENT)
Dept: OBSTETRICS AND GYNECOLOGY | Facility: CLINIC | Age: 75
End: 2025-11-13
Payer: COMMERCIAL